# Patient Record
Sex: FEMALE | Race: WHITE | NOT HISPANIC OR LATINO | Employment: UNEMPLOYED | URBAN - METROPOLITAN AREA
[De-identification: names, ages, dates, MRNs, and addresses within clinical notes are randomized per-mention and may not be internally consistent; named-entity substitution may affect disease eponyms.]

---

## 2019-08-01 ENCOUNTER — OFFICE VISIT (OUTPATIENT)
Dept: SURGERY | Facility: HOSPITAL | Age: 44
End: 2019-08-01
Payer: MEDICARE

## 2019-08-01 VITALS
HEIGHT: 59 IN | RESPIRATION RATE: 16 BRPM | DIASTOLIC BLOOD PRESSURE: 73 MMHG | BODY MASS INDEX: 22.66 KG/M2 | TEMPERATURE: 100.2 F | HEART RATE: 65 BPM | WEIGHT: 112.4 LBS | SYSTOLIC BLOOD PRESSURE: 121 MMHG

## 2019-08-01 DIAGNOSIS — R10.31 RIGHT GROIN PAIN: Primary | ICD-10-CM

## 2019-08-01 PROCEDURE — 99203 OFFICE O/P NEW LOW 30 MIN: CPT | Performed by: SURGERY

## 2019-08-01 RX ORDER — LEVOTHYROXINE SODIUM 0.05 MG/1
TABLET ORAL
Refills: 1 | COMMUNITY
Start: 2019-07-17

## 2019-08-01 RX ORDER — METHYLPHENIDATE HYDROCHLORIDE 54 MG/1
54 TABLET ORAL DAILY
Refills: 0 | COMMUNITY
Start: 2019-07-08

## 2019-08-05 NOTE — PROGRESS NOTES
Assessment/Plan:   Jorge Sanz is a 45 y  o female who is here for Other (New patient who comes in today for evaluation of "possible recurrent RIH'  Patient had open repair with mesh done on 12/19/18 @ [facility]  Patient states today that she does have pain "4" all the time in groin )    Plan: Ilioinguinal nerve injection performed in office, discussed that if symptoms improved will consider repeat with or without ultrasound, ibuprofen and ice, if no relief will consider CT pelvis to rule out recurrence although on physical exam no obvious recurrence    Preoperative Clearance: None    HPI:  Jorge Sanz is a 45 y  o female who was referred for evaluation of Other (New patient who comes in today for evaluation of "possible recurrent RIH'  Patient had open repair with mesh done on 12/19/18 @ [facility]  Patient states today that she does have pain "4" all the time in groin )    Currently   ROS:  General ROS: negative  negative for - chills, fatigue, fever or night sweats, weight loss  Respiratory ROS: no cough, shortness of breath, or wheezing  Cardiovascular ROS: no chest pain or dyspnea on exertion  Genito-Urinary ROS: no dysuria, trouble voiding, or hematuria  Musculoskeletal ROS: negative for - gait disturbance, joint pain or muscle pain  Neurological ROS: no TIA or stroke symptoms  rih groin pain    [unfilled]  Patient has no known allergies      Current Outpatient Medications:     levothyroxine 50 mcg tablet, TAKE 1 TABLET BY MOUTH EVERY DAY IN THE MORNING ON EMPTY STOMACH, Disp: , Rfl: 1    methylphenidate (CONCERTA) 54 MG ER tablet, Take 54 mg by mouth daily, Disp: , Rfl: 0  Past Medical History:   Diagnosis Date    ADD (attention deficit disorder)     ASD (atrial septal defect) 2007    Autism     Depression     Dysmenorrhea     Hearing loss     Right inguinal hernia      Past Surgical History:   Procedure Laterality Date    CARDIAC SURGERY  2007    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    EYE SURGERY  07/03/2012    HYMENECTOMY  07/03/2012    INGUINAL HERNIA REPAIR Right 12/19/2018 12/19/18: Done @ [address] [place] [address] SURGERY  07/03/2012    TONSILLECTOMY  07/03/2012     No family history on file  reports that she has never smoked  She has never used smokeless tobacco     Labs:   No results found for: WBC, HGB, PLT  No results found for: ALT, AST  This SmartLink has not been configured with any valid records  PHYSICAL EXAM  General Appearance:    Alert, cooperative, no distress, AAOx3   Head:    Normocephalic without obvious abnormality   Eyes:    PERRL, conjunctiva/corneas clear, EOM's intact        Neck:   Supple, no adenopathy, no JVD   Back:     Symmetric, no spinal or CVA tenderness   Lungs:     Clear to auscultation bilaterally, no wheezing or rhonchi   Heart:    Regular rate and rhythm, S1 and S2 normal, no murmur   Abdomen:     Soft +BS ND TTP in right groin no obvious recurrence   Extremities:   Extremities normal  No clubbing, cyanosis or edema   Psych:   Normal Affect, AOx3  Neurologic:  Skin:   CNII-XII intact  Strength symmetric, speech intact    Warm, dry, intact, no visible rashes or lesions         Physical Exam              Some portions of this record may have been generated with voice recognition software  There may be translation, syntax,  or grammatical errors  Occasional wrong word or "sound-a-like" substitutions may have occurred due to the inherent limitations of the voice recognition software  Read the chart carefully and recognize, using context, where substitutions may have occurred  If you have any questions, please contact the dictating provider for clarification or correction, as needed  This encounter has been coded by a non-certified coder

## 2019-08-23 ENCOUNTER — OFFICE VISIT (OUTPATIENT)
Dept: SURGERY | Facility: HOSPITAL | Age: 44
End: 2019-08-23
Payer: MEDICARE

## 2019-08-23 VITALS
HEART RATE: 62 BPM | DIASTOLIC BLOOD PRESSURE: 74 MMHG | BODY MASS INDEX: 22.3 KG/M2 | SYSTOLIC BLOOD PRESSURE: 121 MMHG | TEMPERATURE: 99 F | HEIGHT: 59 IN | WEIGHT: 110.6 LBS

## 2019-08-23 DIAGNOSIS — R10.31 GROIN PAIN, CHRONIC, RIGHT: Primary | ICD-10-CM

## 2019-08-23 DIAGNOSIS — G89.29 GROIN PAIN, CHRONIC, RIGHT: Primary | ICD-10-CM

## 2019-08-23 PROCEDURE — 99213 OFFICE O/P EST LOW 20 MIN: CPT | Performed by: SURGERY

## 2019-08-23 NOTE — PROGRESS NOTES
Assessment/Plan: Merlene Garber is a 45year old female who presents today for a four week follow up ilioinguinal injection performed on 8/1/19 and for right groin pain  Patient had open hernia repair with mesh done on 12/19/18 at [facility]  Physical exam revealed no obvious signs of reccurent hernia  At this point, I will refer her to pain management for US and for possible guided ilioinguinal nerve block  The office will schedule her for a consult with pain management  She may follow up as needed  She knows to contact the office if any questions or concerns arise  No problem-specific Assessment & Plan notes found for this encounter  There are no diagnoses linked to this encounter  Subjective:      Patient ID: Merlene Garber is a 45 y o  female  Merlene Garber is a 45year old female who presents today for a four week follow up ilioinguinal injection performed on 8/1/19 and for right groin pain  Vane Moore mentioned that she recently went on vacation  Last time she was in the office, she had ilioinguinal nerve injection  She said that after the injection the pain was completley gone  However, she was pain free only for a week  She started having pain again last Thursday  The following portions of the patient's history were reviewed and updated as appropriate: allergies, current medications, past family history, past medical history, past social history, past surgical history and problem list     Review of Systems      Objective: There were no vitals taken for this visit  Physical Exam   Abdominal:   Pain on the right groin side         By signing my name below, I, Mark Christian, attest that this documentation has been prepared under the direction and in the presence of Megan Aleman MD  Electronically Signed: Marylu Cancino  8/23/19  Ann Marie Eubanks personally performed the services described in this documentation   All medical record entries made by the scribe were at my direction and in my presence  I have reviewed the chart and discharge instructions and agree that the record reflects my personal performance and is accurate and complete  Yanira Mayer MD  8/23/19

## 2019-08-28 ENCOUNTER — CONSULT (OUTPATIENT)
Dept: PAIN MEDICINE | Facility: CLINIC | Age: 44
End: 2019-08-28
Payer: MEDICARE

## 2019-08-28 ENCOUNTER — TELEPHONE (OUTPATIENT)
Dept: PAIN MEDICINE | Facility: CLINIC | Age: 44
End: 2019-08-28

## 2019-08-28 VITALS
DIASTOLIC BLOOD PRESSURE: 60 MMHG | HEIGHT: 59 IN | WEIGHT: 109 LBS | HEART RATE: 53 BPM | BODY MASS INDEX: 21.97 KG/M2 | SYSTOLIC BLOOD PRESSURE: 110 MMHG

## 2019-08-28 DIAGNOSIS — G57.91 ILIOINGUINAL NEURALGIA OF RIGHT SIDE: Primary | ICD-10-CM

## 2019-08-28 PROCEDURE — 99204 OFFICE O/P NEW MOD 45 MIN: CPT | Performed by: ANESTHESIOLOGY

## 2019-08-28 RX ORDER — PREDNISONE 10 MG/1
TABLET ORAL
Qty: 21 TABLET | Refills: 0 | Status: SHIPPED | OUTPATIENT
Start: 2019-08-28

## 2019-08-28 RX ORDER — COVID-19 ANTIGEN TEST
KIT MISCELLANEOUS
COMMUNITY

## 2019-08-28 RX ORDER — ACETAMINOPHEN 325 MG/1
650 TABLET ORAL EVERY 6 HOURS PRN
COMMUNITY

## 2019-08-28 NOTE — PROGRESS NOTES
Assessment  1  Ilioinguinal neuralgia of right side        Plan    At this point the patient's pain persists despite time, relative rest, activity modification, and nonsteroidal anti-inflammatories  Her pain is significantly interfering with her daily living activities  I do believe she has ilioinguinal neuralgia  I will start her on a titrating dose of oral prednisone to address any inflammatory component of the patient's pain  She understands she should not take nonsteroidal anti-inflammatories until she is finished with this steroid  If she has any problems or questions she will give us a call  Will follow up in approximately 10 days time  If at that point her pain persists and significant interferes with daily living activities I would consider right ilioinguinal nerve block  I did fully explain the nerve block to the patient and her parents who were in attendance expressed understanding  My impressions and treatment recommendations were discussed in detail with the patient who verbalized understanding and had no further questions  Discharge instructions were provided  I personally saw and examined the patient and I agree with the above discussed plan of care  New Medications Ordered This Visit   Medications    Naproxen Sodium 220 MG CAPS     Sig: Take by mouth    acetaminophen (TYLENOL) 325 mg tablet     Sig: Take 650 mg by mouth every 6 (six) hours as needed for mild pain    predniSONE 10 mg tablet     Sig: Take according to titration schedule     Dispense:  21 tablet     Refill:  0     rederref by dr Jose Quintero      History of Present Illness    Letty Santos is a 45 y o  female who had right inguinal hernia repair in December of 2018  She is doing quite well with limited activity when he started increasing her activity in March she developed sharp sensation into her right groin    Her pain is moderate to severe she rates anywhere between five to 7/10 on the visual analog scale interfering with daily living activities  Pain is intermittent described sharp and shooting noting that lying down on menstruation decreases or symptoms while standing, bending and walking aggravate them  Exercise heat nice have provided no relief as has nonsteroidal anti-inflammatories  I have personally reviewed and/or updated the patient's past medical history, past surgical history, family history, social history, current medications, allergies, and vital signs today  Review of Systems   Constitutional: Negative for fever and unexpected weight change  HENT: Negative for trouble swallowing  Eyes: Negative for visual disturbance  Respiratory: Negative for shortness of breath and wheezing  Cardiovascular: Negative for chest pain and palpitations  Gastrointestinal: Negative for constipation, diarrhea, nausea and vomiting  Endocrine: Negative for cold intolerance, heat intolerance and polydipsia  Genitourinary: Negative for difficulty urinating and frequency  Musculoskeletal: Positive for gait problem (difficulty walking decreased rom )  Negative for arthralgias, joint swelling and myalgias  Skin: Negative for rash  Neurological: Negative for dizziness, seizures, syncope, weakness and headaches  Hematological: Does not bruise/bleed easily  Psychiatric/Behavioral: Negative for dysphoric mood  All other systems reviewed and are negative        Patient Active Problem List   Diagnosis    Developmental delay    Ostium secundum type atrial septal defect    Tricuspid valve disorders, specified as nonrheumatic    Ilioinguinal neuralgia of right side       Past Medical History:   Diagnosis Date    ADD (attention deficit disorder)     ASD (atrial septal defect) 2007    Autism     Depression     Dysmenorrhea     Hearing loss     Right inguinal hernia        Past Surgical History:   Procedure Laterality Date    CARDIAC SURGERY  2007    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-Done @ [facility]  EYE SURGERY  07/03/2012    HYMENECTOMY  07/03/2012    INGUINAL HERNIA REPAIR Right 12/19/2018 12/19/18: Done @ [address] SURGERY  07/03/2012    TONSILLECTOMY  07/03/2012       No family history on file  Social History     Occupational History    Not on file   Tobacco Use    Smoking status: Never Smoker    Smokeless tobacco: Never Used   Substance and Sexual Activity    Alcohol use: Not on file    Drug use: Not on file    Sexual activity: Not on file       Current Outpatient Medications on File Prior to Visit   Medication Sig    acetaminophen (TYLENOL) 325 mg tablet Take 650 mg by mouth every 6 (six) hours as needed for mild pain    levothyroxine 50 mcg tablet TAKE 1 TABLET BY MOUTH EVERY DAY IN THE MORNING ON EMPTY STOMACH    methylphenidate (CONCERTA) 54 MG ER tablet Take 54 mg by mouth daily    Naproxen Sodium 220 MG CAPS Take by mouth     No current facility-administered medications on file prior to visit  No Known Allergies    Physical Exam    /60   Pulse (!) 53   Ht 4' 11" (1 499 m)   Wt 49 4 kg (109 lb)   BMI 22 02 kg/m²     Constitutional: normal, well developed, well nourished, alert, in no distress and non-toxic and no overt pain behavior  Eyes: anicteric  HEENT: grossly intact  Neck: supple, symmetric, trachea midline and no masses   Pulmonary:even and unlabored  Cardiovascular:No edema or pitting edema present  Skin:Normal without rashes or lesions and well hydrated  Psychiatric:Mood and affect appropriate  Neurologic:Cranial Nerves II-XII grossly intact  Well-healed surgical scar in the right inguinal region without evidence of infection in addition point tenderness along the right inguinal region

## 2019-09-10 NOTE — TELEPHONE ENCOUNTER
Spoke with pt's mother Lindsay Cordero and she states that soon after going off medication pt was "compiling about same pain" would like to know what to do next please advise

## 2019-09-11 NOTE — TELEPHONE ENCOUNTER
Speak with patient's mother is please    I would schedule for an ilio inguinal nerve block under fluoroscopy

## 2019-09-13 NOTE — TELEPHONE ENCOUNTER
S/w pt's mother and procedure scheduled for 9/26/2019  All pre-procedural instructions provided and verbally understanding  Pt's mother would like a call back with more information about the procedure including the Medication being injected

## 2019-09-14 NOTE — TELEPHONE ENCOUNTER
As explained during consult-procedure will be done under X-ray, patient will be lying on her back, the area will be cleraned and then numbing medicine (1% lidocaine) will be injected    Then under X-ray 2% vlidocaine with methylprednisolone 80 mg will be injected

## 2019-09-16 NOTE — TELEPHONE ENCOUNTER
S/w marlen, pt's mother, per release of info on file  Advised marlen, methylprednisolone is the steroid that will be used along with 1% lidocane to numb the site and 2% lidocane to numb the area s/t procedure  Pt will be advised to go home and rest that day, ok to resume normal activites 24 - 48 hours after the procedure - let pain be your quide  Pt may have an increase in pain after the procedure, ok to rest, use ice and take medication as prescribed or directed  Denae Morelos verbalized understanding and appreciation  Will cb if additonal questions / concerns arise

## 2019-09-25 NOTE — TELEPHONE ENCOUNTER
Can someone please speak to mother before we cancel procedure as they might have more questions  is unable to answer then advise about the appt  Thank you

## 2019-09-25 NOTE — TELEPHONE ENCOUNTER
Spoke with patient, returning call to nurse  They are canceling because pt is not in pain  OK to leave a detailed message because of phone tag, she is with clients as well and wants Ivis to have the open slot      Call back # [phone]

## 2019-09-25 NOTE — TELEPHONE ENCOUNTER
Patient's mother Preeti Steinberg called stating patient is not experiencing much pain for almost a week now & is requesting to cancel patient's injection tomorrow   Please advise, jaclyn    Call back # [phone]

## 2021-03-30 DIAGNOSIS — Z23 ENCOUNTER FOR IMMUNIZATION: ICD-10-CM

## 2024-09-25 ENCOUNTER — HOSPITAL ENCOUNTER (OUTPATIENT)
Dept: HOSPITAL 99 - WDC | Age: 49
End: 2024-09-25
Payer: MEDICARE

## 2024-09-25 DIAGNOSIS — Z12.31: Primary | ICD-10-CM

## 2025-06-23 ENCOUNTER — HOSPITAL ENCOUNTER (OUTPATIENT)
Dept: HOSPITAL 99 - RCS | Age: 50
End: 2025-06-23
Payer: MEDICARE

## 2025-06-23 DIAGNOSIS — I35.1: Primary | ICD-10-CM

## 2025-06-23 DIAGNOSIS — Q21.11: ICD-10-CM
